# Patient Record
(demographics unavailable — no encounter records)

---

## 2025-01-06 NOTE — ASSESSMENT
[FreeTextEntry1] : 55 Y OLD MALE WITH PMX OF HTN = AMLO 10 ,LISINOPRIL 20 AND METOPROLOLER 25 ORDERED DYSLIPIDEMIA = ROSUVA 10 AND LABS  PREDIABETES = HBA1C  WT LOSS RECOMM  RTO 3 M FOR CPE

## 2025-01-06 NOTE — PHYSICAL EXAM
[No Acute Distress] : no acute distress [Normal Sclera/Conjunctiva] : normal sclera/conjunctiva [Normal Outer Ear/Nose] : the outer ears and nose were normal in appearance [No Edema] : there was no peripheral edema [Obese] : obese [Normal] : normal [Soft, Nontender] : the abdomen was soft and nontender [No Mass] : no masses were palpated [No HSM] : no hepatosplenomegaly noted [Normal Anterior Cervical Nodes] : no anterior cervical lymphadenopathy [No CVA Tenderness] : no CVA  tenderness [No Rash] : no rash [Coordination Grossly Intact] : coordination grossly intact [No Focal Deficits] : no focal deficits [Alert and Oriented x3] : oriented to person, place, and time

## 2025-04-07 NOTE — HISTORY OF PRESENT ILLNESS
[de-identified] : COMES FOR CPE  UNABLE TO LOOSE WT  TAKES ROSUVASTATIN ON /OFF DUE TO MUSCLE ACHES BUT NOT WILLING TO CHANGE IT

## 2025-04-07 NOTE — ASSESSMENT
[FreeTextEntry1] : CPE OF 55 Y OLD MALE = LABS AND EKG  OBESITY = DISCUSSED;RX NOT COVERED IN THE PAST  DYSLIPIDEMIA = LABS  RTO 3 M  [Vaccines Reviewed] : Immunizations reviewed today. Please see immunization details in the vaccine log within the immunization flowsheet.

## 2025-04-07 NOTE — PHYSICAL EXAM
[No Acute Distress] : no acute distress [Normal Sclera/Conjunctiva] : normal sclera/conjunctiva [Normal Outer Ear/Nose] : the outer ears and nose were normal in appearance [No JVD] : no jugular venous distention [No Edema] : there was no peripheral edema [Obese] : obese [Normal] : normal [Soft, Nontender] : the abdomen was soft and nontender [No Mass] : no masses were palpated [No HSM] : no hepatosplenomegaly noted [Normal Anterior Cervical Nodes] : no anterior cervical lymphadenopathy [No Rash] : no rash [Coordination Grossly Intact] : coordination grossly intact [No Focal Deficits] : no focal deficits [Alert and Oriented x3] : oriented to person, place, and time

## 2025-07-07 NOTE — ASSESSMENT
[FreeTextEntry1] : 55 Y OLD MALE WITH PMX OF HTN AND DYSLIPIDEMIA = LABS ,ROSUVASTATIN ,AMLODIPINE 10 ;[id number] AND LISINOPRIL 20 ORDERED OBESITY = BARIATRIC REFERRAL  RTO 3 M

## 2025-07-07 NOTE — PHYSICAL EXAM
[No Acute Distress] : no acute distress [Normal Sclera/Conjunctiva] : normal sclera/conjunctiva [Normal Outer Ear/Nose] : the outer ears and nose were normal in appearance [No JVD] : no jugular venous distention [No Edema] : there was no peripheral edema [Obese] : obese [Normal] : normal [Soft, Nontender] : the abdomen was soft and nontender [No Mass] : no masses were palpated [No HSM] : no hepatosplenomegaly noted [Coordination Grossly Intact] : coordination grossly intact [No Focal Deficits] : no focal deficits [Alert and Oriented x3] : oriented to person, place, and time

## 2025-07-07 NOTE — HISTORY OF PRESENT ILLNESS
[de-identified] : COMES FOR 3 M F/U  RESUME EXERCISE RECENTLY  NOT WILLING TO TAKE MEDS FOR WT LOSS BUT WILL ACCEPT TO BE REFER TO BARIATRIC SURGEON